# Patient Record
Sex: FEMALE
[De-identification: names, ages, dates, MRNs, and addresses within clinical notes are randomized per-mention and may not be internally consistent; named-entity substitution may affect disease eponyms.]

---

## 2019-08-12 ENCOUNTER — HOSPITAL ENCOUNTER (OUTPATIENT)
Dept: HOSPITAL 95 - ORSCSDS | Age: 74
Discharge: HOME | End: 2019-08-12
Attending: INTERNAL MEDICINE
Payer: COMMERCIAL

## 2019-08-12 VITALS — BODY MASS INDEX: 21.05 KG/M2 | WEIGHT: 118.83 LBS | HEIGHT: 62.99 IN

## 2019-08-12 DIAGNOSIS — K64.8: ICD-10-CM

## 2019-08-12 DIAGNOSIS — Z79.899: ICD-10-CM

## 2019-08-12 DIAGNOSIS — Z12.11: Primary | ICD-10-CM

## 2019-08-12 PROCEDURE — 0DJD8ZZ INSPECTION OF LOWER INTESTINAL TRACT, VIA NATURAL OR ARTIFICIAL OPENING ENDOSCOPIC: ICD-10-PCS | Performed by: INTERNAL MEDICINE

## 2025-03-18 ENCOUNTER — HOSPITAL ENCOUNTER (OUTPATIENT)
Dept: HOSPITAL 95 - ORSCSDS | Age: 80
Discharge: HOME | End: 2025-03-18
Attending: STUDENT IN AN ORGANIZED HEALTH CARE EDUCATION/TRAINING PROGRAM
Payer: COMMERCIAL

## 2025-03-18 VITALS — WEIGHT: 118.83 LBS | BODY MASS INDEX: 21.05 KG/M2 | HEIGHT: 63 IN

## 2025-03-18 VITALS — DIASTOLIC BLOOD PRESSURE: 58 MMHG | SYSTOLIC BLOOD PRESSURE: 128 MMHG

## 2025-03-18 DIAGNOSIS — Z79.899: ICD-10-CM

## 2025-03-18 DIAGNOSIS — H04.123: ICD-10-CM

## 2025-03-18 DIAGNOSIS — H25.813: Primary | ICD-10-CM

## 2025-03-18 PROCEDURE — V2632 POST CHMBR INTRAOCULAR LENS: HCPCS

## 2025-03-18 PROCEDURE — 08RJ3JZ REPLACEMENT OF RIGHT LENS WITH SYNTHETIC SUBSTITUTE, PERCUTANEOUS APPROACH: ICD-10-PCS | Performed by: STUDENT IN AN ORGANIZED HEALTH CARE EDUCATION/TRAINING PROGRAM

## 2025-03-18 NOTE — NUR
03/18/25 1006 Jimena Younger
PT DROWSY, BUT ASKED QUESTIONS DURING DISCHARGE INSTRUCTIONS. ABLE TO
TRANSFER TO CHAIR AND WHEELCHAIR. PT WAS ABLE TO STAND UP AND PUT COAT
ON. PT DISCHARGED WITH GLASSES AND PURSE. REVIEWED INSTRUCTIONS WITH
FRIEND AS WELL.

## 2025-03-26 ENCOUNTER — HOSPITAL ENCOUNTER (OUTPATIENT)
Dept: HOSPITAL 95 - ORSCSDS | Age: 80
Discharge: HOME | End: 2025-03-26
Attending: STUDENT IN AN ORGANIZED HEALTH CARE EDUCATION/TRAINING PROGRAM
Payer: COMMERCIAL

## 2025-03-26 VITALS — SYSTOLIC BLOOD PRESSURE: 127 MMHG | DIASTOLIC BLOOD PRESSURE: 61 MMHG

## 2025-03-26 VITALS — BODY MASS INDEX: 20.74 KG/M2 | WEIGHT: 117.07 LBS | HEIGHT: 63 IN

## 2025-03-26 DIAGNOSIS — Z79.899: ICD-10-CM

## 2025-03-26 DIAGNOSIS — J45.909: ICD-10-CM

## 2025-03-26 DIAGNOSIS — H25.812: Primary | ICD-10-CM

## 2025-03-26 DIAGNOSIS — Z96.1: ICD-10-CM

## 2025-03-26 PROCEDURE — V2632 POST CHMBR INTRAOCULAR LENS: HCPCS

## 2025-03-26 PROCEDURE — 08RK3JZ REPLACEMENT OF LEFT LENS WITH SYNTHETIC SUBSTITUTE, PERCUTANEOUS APPROACH: ICD-10-PCS | Performed by: STUDENT IN AN ORGANIZED HEALTH CARE EDUCATION/TRAINING PROGRAM
